# Patient Record
Sex: FEMALE | Race: WHITE | NOT HISPANIC OR LATINO | URBAN - NONMETROPOLITAN AREA
[De-identification: names, ages, dates, MRNs, and addresses within clinical notes are randomized per-mention and may not be internally consistent; named-entity substitution may affect disease eponyms.]

---

## 2023-11-15 ENCOUNTER — OFFICE (OUTPATIENT)
Dept: URBAN - NONMETROPOLITAN AREA CLINIC 1 | Facility: CLINIC | Age: 28
End: 2023-11-15

## 2023-11-15 ENCOUNTER — OFFICE (OUTPATIENT)
Dept: URBAN - NONMETROPOLITAN AREA CLINIC 1 | Facility: CLINIC | Age: 28
End: 2023-11-15
Payer: COMMERCIAL

## 2023-11-15 VITALS
HEART RATE: 67 BPM | SYSTOLIC BLOOD PRESSURE: 129 MMHG | DIASTOLIC BLOOD PRESSURE: 87 MMHG | WEIGHT: 160 LBS | HEIGHT: 64 IN

## 2023-11-15 VITALS — HEIGHT: 64 IN

## 2023-11-15 DIAGNOSIS — R74.9 ABNORMAL SERUM ENZYME LEVEL, UNSPECIFIED: ICD-10-CM

## 2023-11-15 DIAGNOSIS — F41.9 ANXIETY DISORDER, UNSPECIFIED: ICD-10-CM

## 2023-11-15 PROCEDURE — 99204 OFFICE O/P NEW MOD 45 MIN: CPT | Performed by: NURSE PRACTITIONER

## 2023-11-15 PROCEDURE — 76981 USE PARENCHYMA: CPT | Performed by: NURSE PRACTITIONER

## 2023-11-15 NOTE — SERVICENOTES
Mild to moderate steatosis minimal to no fibrosis- Continue Vit E 400mg bid, follow Mediterranean diet, decrease carb and sugar consumption.  Walk daily.

## 2023-11-15 NOTE — SERVICEHPINOTES
Patient with a history of anxiety presents to the clinic today with her  for elevated liver enzymes.  She reports vague abdominal pain that occurred a few weeks ago and lasted 4-5 days-but has since subsided.  She cannot determine if meals or activity exacerbated discomfort or not.  She denies n/v/d, melena, bloody stools, history of jaundiced.  no unintentional weight loss, or fatty liver history..  She denies tattoos that are unprofessionally done.  No fever.  Reports a good appetite.  She has 1 BM daily.  She does report a 30-40lb weight gain over the last few years after starting Lexapro 10mg po qd for anxiety.  Denies family history of colon cancer.  Denies UC or Crohn's.  Is currently taking Vit E otc.  No rashes or recent travel.  Denies any gi viruses or sickness recently.  Denies any joint pain, autoimmune disease. br
br   Labs from [facility] 10/23bralp 169, alt 125, ast 69, t bili normal 0.6, albumin 4.4, hgb 13.9, hct 44, plt 275Labs from [facility] 7/23bralp 173, alt 198, ast 101, tbili 0.4, albumin 4.4, hgb 13.8
br
br
Fibroscan 11/15/23
br
S2F0-1

## 2023-11-15 NOTE — INTERFACERESULTNOTES
pt notified of lab results. Stop consuming sweet tea and dr. pepper. Mediterranean diet. decrease fried fatty foods. increase walking/exercise and water intake daily. f/u in 6 months fasting.  will call with results after abdominal u/s obtained.

## 2023-11-17 LAB
ACTIN (SMOOTH MUSCLE) ANTIBODY: 5 UNITS (ref 0–19)
ACUTE HEPATITIS: HBSAG SCREEN: NEGATIVE
ACUTE HEPATITIS: HCV AB: NON REACTIVE
ACUTE HEPATITIS: HEP A AB, IGM: NEGATIVE
ACUTE HEPATITIS: HEP B CORE AB, IGM: NEGATIVE
ALPHA-1-ANTITRYPSIN, SERUM: 149 MG/DL (ref 100–188)
ANA BY IFA RFX TITER/PATTERN: NEGATIVE
CBC, PLATELET, NO DIFFERENTIAL: HEMATOCRIT: 41.4 % (ref 34–46.6)
CBC, PLATELET, NO DIFFERENTIAL: HEMOGLOBIN: 13.7 G/DL (ref 11.1–15.9)
CBC, PLATELET, NO DIFFERENTIAL: MCH: 29.8 PG (ref 26.6–33)
CBC, PLATELET, NO DIFFERENTIAL: MCHC: 33.1 G/DL (ref 31.5–35.7)
CBC, PLATELET, NO DIFFERENTIAL: MCV: 90 FL (ref 79–97)
CBC, PLATELET, NO DIFFERENTIAL: NRBC: (no result)
CBC, PLATELET, NO DIFFERENTIAL: PLATELETS: 314 X10E3/UL (ref 150–450)
CBC, PLATELET, NO DIFFERENTIAL: RBC: 4.6 X10E6/UL (ref 3.77–5.28)
CBC, PLATELET, NO DIFFERENTIAL: RDW: 12.9 % (ref 11.7–15.4)
CBC, PLATELET, NO DIFFERENTIAL: WBC: 7 X10E3/UL (ref 3.4–10.8)
CERULOPLASMIN: 24.1 MG/DL (ref 19–39)
COMP. METABOLIC PANEL (14): A/G RATIO: 1.9 (ref 1.2–2.2)
COMP. METABOLIC PANEL (14): ALBUMIN: 4.5 G/DL (ref 4–5)
COMP. METABOLIC PANEL (14): ALKALINE PHOSPHATASE: 172 IU/L — HIGH (ref 44–121)
COMP. METABOLIC PANEL (14): ALT (SGPT): 69 IU/L — HIGH (ref 0–32)
COMP. METABOLIC PANEL (14): AST (SGOT): 28 IU/L (ref 0–40)
COMP. METABOLIC PANEL (14): BILIRUBIN, TOTAL: <0.2 MG/DL
COMP. METABOLIC PANEL (14): BUN/CREATININE RATIO: 37 — HIGH (ref 9–23)
COMP. METABOLIC PANEL (14): BUN: 23 MG/DL — HIGH (ref 6–20)
COMP. METABOLIC PANEL (14): CALCIUM: 9.3 MG/DL (ref 8.7–10.2)
COMP. METABOLIC PANEL (14): CARBON DIOXIDE, TOTAL: 23 MMOL/L (ref 20–29)
COMP. METABOLIC PANEL (14): CHLORIDE: 104 MMOL/L (ref 96–106)
COMP. METABOLIC PANEL (14): CREATININE: 0.63 MG/DL (ref 0.57–1)
COMP. METABOLIC PANEL (14): EGFR: 123 ML/MIN/1.73 (ref 59–?)
COMP. METABOLIC PANEL (14): GLOBULIN, TOTAL: 2.4 G/DL (ref 1.5–4.5)
COMP. METABOLIC PANEL (14): GLUCOSE: 91 MG/DL (ref 70–99)
COMP. METABOLIC PANEL (14): POTASSIUM: 4.3 MMOL/L (ref 3.5–5.2)
COMP. METABOLIC PANEL (14): PROTEIN, TOTAL: 6.9 G/DL (ref 6–8.5)
COMP. METABOLIC PANEL (14): SODIUM: 138 MMOL/L (ref 134–144)
FERRITIN: 66 NG/ML (ref 15–150)
GGT: 149 IU/L — HIGH (ref 0–60)
INTERPRETATION: (no result)
IRON AND TIBC: IRON BIND.CAP.(TIBC): 341 UG/DL (ref 250–450)
IRON AND TIBC: IRON SATURATION: 21 % (ref 15–55)
IRON AND TIBC: IRON: 71 UG/DL (ref 27–159)
IRON AND TIBC: UIBC: 270 UG/DL (ref 131–425)
LIPID PANEL: CHOLESTEROL, TOTAL: 202 MG/DL — HIGH (ref 100–199)
LIPID PANEL: COMMENT: (no result)
LIPID PANEL: HDL CHOLESTEROL: 50 MG/DL (ref 39–?)
LIPID PANEL: LDL CHOL CALC (NIH): 129 MG/DL — HIGH (ref 0–99)
LIPID PANEL: TRIGLYCERIDES: 126 MG/DL (ref 0–149)
LIPID PANEL: VLDL CHOLESTEROL CAL: 23 MG/DL (ref 5–40)
MITOCHONDRIAL (M2) ANTIBODY: <20 UNITS
PROTHROMBIN TIME (PT): INR: 1 (ref 0.9–1.2)
PROTHROMBIN TIME (PT): PROTHROMBIN TIME: 10 SEC (ref 9.1–12)
PTT, ACTIVATED: APTT: 28 SEC (ref 24–33)

## 2024-06-03 ENCOUNTER — OFFICE (OUTPATIENT)
Dept: URBAN - NONMETROPOLITAN AREA CLINIC 1 | Facility: CLINIC | Age: 29
End: 2024-06-03
Payer: COMMERCIAL

## 2024-06-03 VITALS
HEIGHT: 64 IN | WEIGHT: 162 LBS | SYSTOLIC BLOOD PRESSURE: 117 MMHG | HEART RATE: 74 BPM | DIASTOLIC BLOOD PRESSURE: 71 MMHG

## 2024-06-03 DIAGNOSIS — K76.0 FATTY (CHANGE OF) LIVER, NOT ELSEWHERE CLASSIFIED: ICD-10-CM

## 2024-06-03 PROCEDURE — 99214 OFFICE O/P EST MOD 30 MIN: CPT | Performed by: NURSE PRACTITIONER

## 2024-06-03 PROCEDURE — 36415 COLL VENOUS BLD VENIPUNCTURE: CPT | Performed by: NURSE PRACTITIONER

## 2024-06-03 NOTE — SERVICEHPINOTES
Patient with a history of fatty liver and anxiety presents to the clinic today with her spouse for follow up.  She was originally evaluated in the fall of 2023 for elevated LFTs.  Fibroscan 11/23 S2F0-1. Abdominal u/s normal.  Acute hepatitis panel and iron studies neg. Autoimmune workup neg. GGT elevated-u/s  normal. She denies any ETOH or drug use.  She denies any abdominal pain, n/v, or swelling.  She has not been exercising or following a Mediterranean diet, but reports she has been eating more greens.  She takes supplements otc and vit E prescribed by PCP.  br
br-Abdominal ultrasound 11 / 23 brLiver unremarkable, gallbladder unremarkable, CBD normal, no acute findingsbr
br -FibroScan 11/15/2023 S2 F0 to 1br 
br -acute hepatitis panel negative, iron studies negative, actin smooth muscle antibody normal, AMA negative, ceruloplasmin normal, BETHANY normal, BETHANY normal, GGT elevated 149, no anemia or leukocytosis, platelets within normal limits, Alk phos elevated 172, AST normal 28, ALT elevated 69, T bili 0.2, albumin 4.5,br   
br [facility] labs 10/2023 alk-phos 169, , AST 69, normal T bili and albumin lipase normalbr[facility] labs 07/2023 alk-phos 173, ,  normal T bili and albumin

## 2024-06-03 NOTE — SERVICENOTES
Will check labs today and trend LFTS. 
U/s normal and Fibroscan S2F1
Stressed importance of Mediterranean diet, decrease consumption of fried fatty foods
Avoid all NSAIDs and ETOH
F/u in 6 months.

## 2024-12-16 ENCOUNTER — OFFICE (OUTPATIENT)
Dept: URBAN - NONMETROPOLITAN AREA CLINIC 1 | Facility: CLINIC | Age: 29
End: 2024-12-16
Payer: COMMERCIAL

## 2024-12-16 VITALS — HEIGHT: 64 IN

## 2024-12-16 DIAGNOSIS — K76.0 FATTY (CHANGE OF) LIVER, NOT ELSEWHERE CLASSIFIED: ICD-10-CM

## 2024-12-16 DIAGNOSIS — R74.8 ABNORMAL LEVELS OF OTHER SERUM ENZYMES: ICD-10-CM

## 2024-12-16 PROCEDURE — 76700 US EXAM ABDOM COMPLETE: CPT | Mod: TC | Performed by: NURSE PRACTITIONER
